# Patient Record
Sex: MALE | Race: WHITE | NOT HISPANIC OR LATINO | URBAN - METROPOLITAN AREA
[De-identification: names, ages, dates, MRNs, and addresses within clinical notes are randomized per-mention and may not be internally consistent; named-entity substitution may affect disease eponyms.]

---

## 2021-08-05 ENCOUNTER — EMERGENCY (EMERGENCY)
Facility: HOSPITAL | Age: 51
LOS: 0 days | Discharge: HOME | End: 2021-08-05
Attending: EMERGENCY MEDICINE
Payer: COMMERCIAL

## 2021-08-05 VITALS — HEIGHT: 67 IN | WEIGHT: 160.06 LBS

## 2021-08-05 VITALS
HEART RATE: 61 BPM | SYSTOLIC BLOOD PRESSURE: 122 MMHG | OXYGEN SATURATION: 100 % | RESPIRATION RATE: 17 BRPM | DIASTOLIC BLOOD PRESSURE: 87 MMHG | TEMPERATURE: 98 F

## 2021-08-05 DIAGNOSIS — E78.5 HYPERLIPIDEMIA, UNSPECIFIED: ICD-10-CM

## 2021-08-05 DIAGNOSIS — S43.102A UNSPECIFIED DISLOCATION OF LEFT ACROMIOCLAVICULAR JOINT, INITIAL ENCOUNTER: ICD-10-CM
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
## 2021-08-05 DIAGNOSIS — M25.512 PAIN IN LEFT SHOULDER: ICD-10-CM

## 2021-08-05 DIAGNOSIS — Z88.8 ALLERGY STATUS TO OTHER DRUGS, MEDICAMENTS AND BIOLOGICAL SUBSTANCES: ICD-10-CM

## 2021-08-05 PROCEDURE — 73030 X-RAY EXAM OF SHOULDER: CPT | Mod: LT

## 2021-08-05 PROCEDURE — 99284 EMERGENCY DEPT VISIT MOD MDM: CPT | Mod: 25

## 2021-08-05 PROCEDURE — 73010 X-RAY EXAM OF SHOULDER BLADE: CPT | Mod: LT

## 2021-08-05 PROCEDURE — 99284 EMERGENCY DEPT VISIT MOD MDM: CPT

## 2021-08-05 PROCEDURE — 73000 X-RAY EXAM OF COLLAR BONE: CPT | Mod: 26,LT

## 2021-08-05 PROCEDURE — 73000 X-RAY EXAM OF COLLAR BONE: CPT | Mod: LT

## 2021-08-05 PROCEDURE — 73010 X-RAY EXAM OF SHOULDER BLADE: CPT | Mod: 26,LT

## 2021-08-05 PROCEDURE — 73030 X-RAY EXAM OF SHOULDER: CPT | Mod: 26,LT

## 2021-08-05 RX ORDER — IBUPROFEN 200 MG
800 TABLET ORAL ONCE
Refills: 0 | Status: COMPLETED | OUTPATIENT
Start: 2021-08-05 | End: 2021-08-05

## 2021-08-05 RX ADMIN — Medication 800 MILLIGRAM(S): at 21:39

## 2021-08-05 NOTE — ED STATDOCS - CHIEF COMPLAINT
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  I don't know what procedures she will do awake.     I don't think there is great urgency to this (honestly it could wait a year or 2, but ultimately it would make sense to remove it for diagnosis) and definitely do not think it's worth thousands to have it done here.     The patient is a 51y year old Male complaining of fall.
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
## 2021-08-05 NOTE — ED STATDOCS - CARE PROVIDER_API CALL
Rupali Lowe)  [facility]  [address] [place] [address]  Phone: [phone]  Fax: [phone]  Follow Up Time:

## 2021-08-05 NOTE — ED STATDOCS - NSFOLLOWUPINSTRUCTIONS_ED_ALL_ED_FT
Acromioclavicular Separation    WHAT YOU NEED TO KNOW:    An acromioclavicular separation (AS), or shoulder separation, is when your shoulder and collarbone move or come apart. The bones move or come apart because the ligaments that hold the bones in place are stretched or torn.    DISCHARGE INSTRUCTIONS:    Medicines: You may need any of the following:   •Acetaminophen decreases pain and is available without a doctor's order. Ask how much to take and how often to take it. Follow directions. Acetaminophen can cause liver damage if not taken correctly.      •NSAIDs help decrease swelling and pain. This medicine is available with or without a doctor's order. NSAIDs can cause stomach bleeding or kidney problems in certain people. If you take blood thinner medicine, always ask your healthcare provider if NSAIDs are safe for you. Always read the medicine label and follow directions.      •A Tetanus (Td) vaccine may be needed if you have an open wound. This vaccine is a booster shot used to help prevent diphtheria and tetanus.      •Take your medicine as directed. Contact your healthcare provider if you think your medicine is not helping or if you have side effects. Tell him if you are allergic to any medicine. Keep a list of the medicines, vitamins, and herbs you take. Include the amounts, and when and why you take them. Bring the list or the pill bottles to follow-up visits. Carry your medicine list with you in case of an emergency.      Apply ice: Apply ice on your shoulder for 15 to 20 minutes every hour for the first 1 to 2 days. Use an ice pack, or put crushed ice in a plastic bag. Cover it with a towel. Ice helps prevent tissue damage and decreases swelling and pain.    Apply heat: Apply heat on your shoulder for 20 to 30 minutes every 2 hours after the first 1 to 2 days. Heat helps decrease pain and muscle spasms.    Wear your support device: You may need to wear a strap, elastic bandage, or sling. These devices keep your shoulder in the correct position so it can heal.   •Wear the strap or sling constantly for 6 to 8 weeks, even when you sleep. You may remove the strap or sling when you bathe. Do not move your shoulder or arm when the strap or sling is off. Do not lift your arm.      •The strap or sling must be tightened by another person every day. Tighten it enough to keep your shoulders back in the correct posture. Tell the person to allow enough room to fit an index finger between your body and the strap. Put a folded wash cloth in your armpit to prevent pressure on the nerves by the strap. Loosen the strap if you feel numbness or tingling in your arm or hand.      Rest your shoulder: Rest your shoulder as much as possible to decrease swelling and help it heal.     Follow up with your healthcare provider as directed: Write down your questions so you remember to ask them during your visits.     Contact your healthcare provider if:   •You have a fever.      •You have worse pain, even after you take medicine.      •You have an open wound that is red, swollen, or draining pus.      •Your arm or hand becomes numb or tingles.      •You have questions or concerns about your condition or care.      Return to the emergency department if:   •You lose feeling in your arm or hand.      •You cannot move your arm or hand.         © Copyright [employer] 2021           back to top                          © Copyright [employer] 2021
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
## 2021-08-05 NOTE — ED STATDOCS - PATIENT PORTAL LINK FT
You can access the FollowMyHealth Patient Portal offered by [facility] by registering at the following website: [email or web address]. By joining [employer]’s FollowMyHealth portal, you will also be able to view your health information using other applications (apps) compatible with our system.

## 2021-08-06 ENCOUNTER — TRANSCRIPTION ENCOUNTER (OUTPATIENT)
Age: 51
End: 2021-08-06
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
## 2021-12-25 ENCOUNTER — TRANSCRIPTION ENCOUNTER (OUTPATIENT)
Age: 51
End: 2021-12-25

## 2021-12-31 ENCOUNTER — TRANSCRIPTION ENCOUNTER (OUTPATIENT)
Age: 51
End: 2021-12-31

## 2025-05-28 NOTE — ED ADULT TRIAGE NOTE - NS ED NURSE BANDS TYPE
Medication passed protocol.     Medication:    Disp Refills Start End    pantoprazole (PROTONIX) 40 MG tablet 180 tablet 2 3/26/2024 --    Sig: TAKE 1 TABLET BY MOUTH TWICE DAILY      Last office visit date: 11/27/24    Next appointment scheduled?: Yes   
Name band;

## 2025-08-14 ENCOUNTER — APPOINTMENT (OUTPATIENT)
Dept: NEPHROLOGY | Facility: CLINIC | Age: 55
End: 2025-08-14
Payer: COMMERCIAL

## 2025-08-14 VITALS
WEIGHT: 165 LBS | BODY MASS INDEX: 25.9 KG/M2 | SYSTOLIC BLOOD PRESSURE: 122 MMHG | TEMPERATURE: 98.2 F | HEIGHT: 67 IN | DIASTOLIC BLOOD PRESSURE: 80 MMHG | HEART RATE: 62 BPM | OXYGEN SATURATION: 98 %

## 2025-08-14 DIAGNOSIS — J30.2 OTHER SEASONAL ALLERGIC RHINITIS: ICD-10-CM

## 2025-08-14 DIAGNOSIS — E78.2 MIXED HYPERLIPIDEMIA: ICD-10-CM

## 2025-08-14 DIAGNOSIS — Z00.00 ENCOUNTER FOR GENERAL ADULT MEDICAL EXAMINATION W/OUT ABNORMAL FINDINGS: ICD-10-CM

## 2025-08-14 DIAGNOSIS — G43.909 MIGRAINE, UNSPECIFIED, NOT INTRACTABLE, W/OUT STATUS MIGRAINOSUS: ICD-10-CM

## 2025-08-14 DIAGNOSIS — F41.1 GENERALIZED ANXIETY DISORDER: ICD-10-CM

## 2025-08-14 PROCEDURE — 99204 OFFICE O/P NEW MOD 45 MIN: CPT

## 2025-08-14 RX ORDER — PSYLLIUM HUSK 0.4 G
360 CAPSULE ORAL
Refills: 0 | Status: ACTIVE | COMMUNITY

## 2025-08-14 RX ORDER — EZETIMIBE 10 MG/1
10 TABLET ORAL
Refills: 0 | Status: ACTIVE | COMMUNITY

## 2025-08-14 RX ORDER — DICLOFENAC SODIUM 25 MG/1
25 TABLET, DELAYED RELEASE ORAL
Refills: 0 | Status: ACTIVE | COMMUNITY

## 2025-08-14 RX ORDER — SUMATRIPTAN 100 MG/1
100 TABLET, FILM COATED ORAL
Refills: 0 | Status: ACTIVE | COMMUNITY

## 2025-08-14 RX ORDER — SERTRALINE HYDROCHLORIDE 100 MG/1
100 TABLET, FILM COATED ORAL
Refills: 0 | Status: ACTIVE | COMMUNITY

## 2025-08-14 RX ORDER — LORATADINE 10 MG/1
10 CAPSULE, LIQUID FILLED ORAL
Refills: 0 | Status: ACTIVE | COMMUNITY

## 2025-08-14 RX ORDER — TOPIRAMATE 25 MG/1
25 TABLET, COATED ORAL
Refills: 0 | Status: ACTIVE | COMMUNITY

## 2025-08-14 RX ORDER — BUPROPION HYDROCHLORIDE 300 MG/1
300 TABLET, EXTENDED RELEASE ORAL
Refills: 0 | Status: ACTIVE | COMMUNITY